# Patient Record
Sex: FEMALE | Race: WHITE | URBAN - METROPOLITAN AREA
[De-identification: names, ages, dates, MRNs, and addresses within clinical notes are randomized per-mention and may not be internally consistent; named-entity substitution may affect disease eponyms.]

---

## 2024-07-16 ENCOUNTER — OFFICE VISIT (OUTPATIENT)
Age: 69
End: 2024-07-16

## 2024-07-16 VITALS
SYSTOLIC BLOOD PRESSURE: 138 MMHG | OXYGEN SATURATION: 95 % | TEMPERATURE: 98.1 F | WEIGHT: 219 LBS | HEART RATE: 72 BPM | DIASTOLIC BLOOD PRESSURE: 78 MMHG

## 2024-07-16 DIAGNOSIS — L02.91 ABSCESS: Primary | ICD-10-CM

## 2024-07-16 RX ORDER — SULFAMETHOXAZOLE AND TRIMETHOPRIM 800; 160 MG/1; MG/1
1 TABLET ORAL 2 TIMES DAILY
Qty: 20 TABLET | Refills: 0 | Status: SHIPPED | OUTPATIENT
Start: 2024-07-16 | End: 2024-07-26

## 2024-07-16 RX ORDER — ROSUVASTATIN CALCIUM 5 MG/1
5 TABLET, COATED ORAL DAILY
COMMUNITY
Start: 2024-05-23

## 2024-07-16 RX ORDER — TRIAMTERENE AND HYDROCHLOROTHIAZIDE 37.5; 25 MG/1; MG/1
1 TABLET ORAL EVERY MORNING
COMMUNITY

## 2024-07-16 RX ORDER — DOXYCYCLINE HYCLATE 100 MG
100 TABLET ORAL 2 TIMES DAILY
Qty: 20 TABLET | Refills: 0 | Status: SHIPPED | OUTPATIENT
Start: 2024-07-16 | End: 2024-07-26

## 2024-07-16 RX ORDER — FAMOTIDINE 40 MG/1
40 TABLET, FILM COATED ORAL NIGHTLY PRN
COMMUNITY
Start: 2023-11-10

## 2024-07-16 RX ORDER — PANTOPRAZOLE SODIUM 40 MG/1
40 TABLET, DELAYED RELEASE ORAL DAILY
COMMUNITY

## 2024-07-16 RX ORDER — SEMAGLUTIDE 0.68 MG/ML
INJECTION, SOLUTION SUBCUTANEOUS
COMMUNITY

## 2024-07-16 ASSESSMENT — ENCOUNTER SYMPTOMS
SHORTNESS OF BREATH: 0
VOMITING: 0
COUGH: 0

## 2024-07-16 NOTE — PROGRESS NOTES
Amor Landaverde (:  1955) is a 69 y.o. female,New patient, here for evaluation of the following chief complaint(s):  Rash (Cyst on back of neck - has had for years but has recently started opening up)      ASSESSMENT/PLAN:  1. Abscess    - sulfamethoxazole-trimethoprim (BACTRIM DS;SEPTRA DS) 800-160 MG per tablet; Take 1 tablet by mouth 2 times daily for 10 days  Dispense: 20 tablet; Refill: 0  - doxycycline hyclate (VIBRA-TABS) 100 MG tablet; Take 1 tablet by mouth 2 times daily for 10 days  Dispense: 20 tablet; Refill: 0     -f/u tomorrow for a recheck.  No follow-ups on file.    SUBJECTIVE/OBJECTIVE:  Pt c/o a cyst in posterior neck,chronic that became infected,a recurrent problem.has dermatolgy appoint for surgical removal      History provided by:  Patient  Rash  This is a recurrent problem. The current episode started more than 1 month ago. The affected locations include the neck. The rash is characterized by swelling and redness. Pertinent negatives include no cough, fever, shortness of breath or vomiting. Past treatments include nothing.       Vitals:    24 1401   BP: 138/78   Site: Right Upper Arm   Position: Sitting   Cuff Size: Large Adult   Pulse: 72   Temp: 98.1 °F (36.7 °C)   TempSrc: Oral   SpO2: 95%   Weight: 99.3 kg (219 lb)       Review of Systems   Constitutional:  Negative for fever.   Respiratory:  Negative for cough and shortness of breath.    Gastrointestinal:  Negative for vomiting.   Skin:  Positive for rash.       Physical Exam  Constitutional:       General: She is not in acute distress.  HENT:      Nose: No congestion.      Mouth/Throat:      Mouth: Mucous membranes are moist.      Pharynx: No posterior oropharyngeal erythema.   Eyes:      Conjunctiva/sclera: Conjunctivae normal.      Pupils: Pupils are equal, round, and reactive to light.   Cardiovascular:      Rate and Rhythm: Normal rate and regular rhythm.   Pulmonary:      Effort: Pulmonary effort is normal. No

## 2024-07-17 ENCOUNTER — OFFICE VISIT (OUTPATIENT)
Age: 69
End: 2024-07-17

## 2024-07-17 VITALS
OXYGEN SATURATION: 95 % | SYSTOLIC BLOOD PRESSURE: 120 MMHG | DIASTOLIC BLOOD PRESSURE: 76 MMHG | WEIGHT: 219 LBS | TEMPERATURE: 98.8 F | HEART RATE: 93 BPM

## 2024-07-17 DIAGNOSIS — L02.91 ABSCESS: Primary | ICD-10-CM

## 2024-07-17 NOTE — PROGRESS NOTES
Amor Landaverde (:  1955) is a 69 y.o. female,Established patient, here for evaluation of the following chief complaint(s):  Wound Check (Remove packing on cyst )      ASSESSMENT/PLAN:  1. Abscess    -pt will return tomorrow for recheck,continue with AB.       No follow-ups on file.    SUBJECTIVE/OBJECTIVE:  S/p I&D,D#2,on Bactrim and doxy,no fever or pain.      History provided by:  Patient  Wound Check  She was originally treated yesterday. There is no redness present.       Vitals:    24 1412   BP: 120/76   Site: Right Upper Arm   Position: Sitting   Cuff Size: Medium Adult   Pulse: 93   Temp: 98.8 °F (37.1 °C)   TempSrc: Oral   SpO2: 95%   Weight: 99.3 kg (219 lb)       Review of Systems   Constitutional:  Negative for activity change, appetite change and fever.   Skin:  Negative for rash.       Physical Exam  Constitutional:       General: She is not in acute distress.  HENT:      Mouth/Throat:      Mouth: Mucous membranes are moist.      Pharynx: No posterior oropharyngeal erythema.   Eyes:      Conjunctiva/sclera: Conjunctivae normal.      Pupils: Pupils are equal, round, and reactive to light.   Cardiovascular:      Rate and Rhythm: Normal rate and regular rhythm.   Pulmonary:      Effort: Pulmonary effort is normal. No respiratory distress.   Musculoskeletal:      Cervical back: Neck supple. No rigidity.   Skin:     Comments: Packing was removed,still with purulent drainage that was removed,was repacked,dressing was applied,tolerated well.   Neurological:      Mental Status: She is alert.           An electronic signature was used to authenticate this note.    --PAUL TELLEZ MD

## 2024-07-18 ENCOUNTER — OFFICE VISIT (OUTPATIENT)
Age: 69
End: 2024-07-18

## 2024-07-18 VITALS
HEART RATE: 83 BPM | BODY MASS INDEX: 37.39 KG/M2 | SYSTOLIC BLOOD PRESSURE: 124 MMHG | HEIGHT: 64 IN | RESPIRATION RATE: 16 BRPM | DIASTOLIC BLOOD PRESSURE: 75 MMHG | WEIGHT: 219 LBS | TEMPERATURE: 98.4 F | OXYGEN SATURATION: 96 %

## 2024-07-18 DIAGNOSIS — Z51.89 WOUND CHECK, ABSCESS: Primary | ICD-10-CM

## 2024-07-18 NOTE — PROGRESS NOTES
Amor Landaverde (:  1955) is a 69 y.o. female,Established patient, here for evaluation of the following chief complaint(s):  Abscess (Wound check up)      Assessment & Plan :            Subjective :  Arrived with Wound to lower cervical area of posterior neck (packed)  Minimal drainage noted when packing removed. No warmth or swelling noted  Wound irrigated. With sterile saline. No purulent drainage noted. DSD applied. Pt has appt w/ derm to have wound excised and debrided.       History provided by:  Patient      69 y.o. female presents with abscess to posterior neck that has been incised and packed.         Vitals:    24 1412   BP: 124/75   Site: Right Upper Arm   Position: Sitting   Cuff Size: Large Adult   Pulse: 83   Resp: 16   Temp: 98.4 °F (36.9 °C)   TempSrc: Oral   SpO2: 96%   Weight: 99.3 kg (219 lb)   Height: 1.626 m (5' 4\")       No results found for this visit on 24.      Objective   Physical Exam  Constitutional:       General: She is not in acute distress.     Appearance: She is well-developed.   HENT:      Right Ear: External ear normal.      Left Ear: External ear normal.      Nose: Nose normal.      Mouth/Throat:      Lips: Pink.   Eyes:      General: Lids are normal.   Cardiovascular:      Rate and Rhythm: Normal rate.   Pulmonary:      Effort: Pulmonary effort is normal.   Skin:     General: Skin is warm.      Findings: Abscess, erythema and wound present.          Neurological:      Mental Status: She is alert.   Psychiatric:         Behavior: Behavior is cooperative.            An electronic signature was used to authenticate this note.       Lindsay Gamez, LUIS - CNP

## 2024-07-18 NOTE — PATIENT INSTRUCTIONS
Discharge medications reviewed with the patient and appropriate educational materials and side effects teaching were provided.    Please refer to printed education given at discharge. Continue and finish current abx's